# Patient Record
Sex: FEMALE | Race: WHITE | Employment: OTHER | URBAN - METROPOLITAN AREA
[De-identification: names, ages, dates, MRNs, and addresses within clinical notes are randomized per-mention and may not be internally consistent; named-entity substitution may affect disease eponyms.]

---

## 2017-01-06 PROCEDURE — 81003 URINALYSIS AUTO W/O SCOPE: CPT | Performed by: FAMILY MEDICINE

## 2017-08-30 PROCEDURE — 87186 SC STD MICRODIL/AGAR DIL: CPT | Performed by: FAMILY MEDICINE

## 2017-08-30 PROCEDURE — 87088 URINE BACTERIA CULTURE: CPT | Performed by: FAMILY MEDICINE

## 2017-08-30 PROCEDURE — 87086 URINE CULTURE/COLONY COUNT: CPT | Performed by: FAMILY MEDICINE

## 2018-02-01 PROCEDURE — 88175 CYTOPATH C/V AUTO FLUID REDO: CPT | Performed by: OBSTETRICS & GYNECOLOGY

## 2019-02-03 PROBLEM — M81.0 AGE-RELATED OSTEOPOROSIS WITHOUT CURRENT PATHOLOGICAL FRACTURE: Status: ACTIVE | Noted: 2019-02-03

## 2019-05-29 ENCOUNTER — HOSPITAL ENCOUNTER (OUTPATIENT)
Dept: GENERAL RADIOLOGY | Facility: HOSPITAL | Age: 74
Discharge: HOME | End: 2019-05-29
Attending: FAMILY MEDICINE
Payer: OTHER MISCELLANEOUS

## 2019-05-29 ENCOUNTER — OFFICE VISIT (OUTPATIENT)
Dept: OTHER | Facility: HOSPITAL | Age: 74
End: 2019-05-29
Attending: FAMILY MEDICINE
Payer: OTHER MISCELLANEOUS

## 2019-05-29 DIAGNOSIS — S20.212A RIB CONTUSION, LEFT, INITIAL ENCOUNTER: Primary | ICD-10-CM

## 2019-05-29 DIAGNOSIS — S70.02XA CONTUSION OF LEFT HIP, INITIAL ENCOUNTER: ICD-10-CM

## 2019-05-29 PROCEDURE — 71101 X-RAY EXAM UNILAT RIBS/CHEST: CPT | Performed by: FAMILY MEDICINE

## 2019-05-29 PROCEDURE — 73502 X-RAY EXAM HIP UNI 2-3 VIEWS: CPT | Performed by: FAMILY MEDICINE

## 2019-05-31 ENCOUNTER — APPOINTMENT (OUTPATIENT)
Dept: OTHER | Facility: HOSPITAL | Age: 74
End: 2019-05-31
Attending: FAMILY MEDICINE
Payer: OTHER MISCELLANEOUS

## 2019-06-04 ENCOUNTER — OFFICE VISIT (OUTPATIENT)
Dept: OTHER | Facility: HOSPITAL | Age: 74
End: 2019-06-04
Attending: PREVENTIVE MEDICINE
Payer: OTHER MISCELLANEOUS

## 2019-06-04 ENCOUNTER — HOSPITAL ENCOUNTER (OUTPATIENT)
Dept: GENERAL RADIOLOGY | Facility: HOSPITAL | Age: 74
Discharge: HOME | End: 2019-06-04
Attending: PREVENTIVE MEDICINE
Payer: OTHER MISCELLANEOUS

## 2019-06-04 ENCOUNTER — HOSPITAL ENCOUNTER (OUTPATIENT)
Dept: CT IMAGING | Facility: HOSPITAL | Age: 74
Discharge: HOME | End: 2019-06-04
Attending: PREVENTIVE MEDICINE
Payer: OTHER MISCELLANEOUS

## 2019-06-04 DIAGNOSIS — S20.212S RIB CONTUSION, LEFT, SEQUELA: Primary | ICD-10-CM

## 2019-06-04 PROCEDURE — 71100 X-RAY EXAM RIBS UNI 2 VIEWS: CPT | Performed by: PREVENTIVE MEDICINE

## 2019-06-04 PROCEDURE — 70450 CT HEAD/BRAIN W/O DYE: CPT | Performed by: PREVENTIVE MEDICINE

## 2019-06-13 ENCOUNTER — OFFICE VISIT (OUTPATIENT)
Dept: OTHER | Facility: HOSPITAL | Age: 74
End: 2019-06-13
Attending: PREVENTIVE MEDICINE
Payer: OTHER MISCELLANEOUS

## 2019-06-13 ENCOUNTER — HOSPITAL ENCOUNTER (OUTPATIENT)
Dept: ULTRASOUND IMAGING | Facility: HOSPITAL | Age: 74
Discharge: HOME | End: 2019-06-13
Attending: PREVENTIVE MEDICINE
Payer: OTHER MISCELLANEOUS

## 2019-06-13 DIAGNOSIS — S80.11XA CONTUSION OF RIGHT LOWER LEG: Primary | ICD-10-CM

## 2019-06-13 PROCEDURE — 93971 EXTREMITY STUDY: CPT | Performed by: PREVENTIVE MEDICINE

## 2019-06-21 ENCOUNTER — OFFICE VISIT (OUTPATIENT)
Dept: PHYSICAL THERAPY | Age: 74
End: 2019-06-21
Attending: PREVENTIVE MEDICINE
Payer: OTHER MISCELLANEOUS

## 2019-06-21 DIAGNOSIS — S16.1XXA CERVICAL STRAIN: ICD-10-CM

## 2019-06-21 DIAGNOSIS — S20.219A CONTUSION OF CHEST: ICD-10-CM

## 2019-06-21 DIAGNOSIS — S46.919A SHOULDER STRAIN: ICD-10-CM

## 2019-06-21 PROCEDURE — 97162 PT EVAL MOD COMPLEX 30 MIN: CPT

## 2019-06-21 PROCEDURE — 97110 THERAPEUTIC EXERCISES: CPT

## 2019-06-24 ENCOUNTER — OFFICE VISIT (OUTPATIENT)
Dept: PHYSICAL THERAPY | Age: 74
End: 2019-06-24
Attending: PREVENTIVE MEDICINE
Payer: OTHER MISCELLANEOUS

## 2019-06-24 PROCEDURE — 97140 MANUAL THERAPY 1/> REGIONS: CPT

## 2019-06-24 PROCEDURE — 97110 THERAPEUTIC EXERCISES: CPT

## 2019-06-24 NOTE — PROGRESS NOTES
Dx: Shoulder strain; Cervical strain; Contusion of chest         Insurance (Authorized # of Visits):  [id number] [facility] Physician: Dr. Carmela White  Next MD visit: none scheduled  Fall Risk: standard         Precautions: n/a             Subjective: No new Standing: bilat shoulder flexion, abduction x10 ea       Reviewed HEP--cues provided for proper performance of all exercises       HEP: Added scapular rows and sh ext with RTB to HEP    Charges: Ex 2 MT 1       Total Timed Treatment: 45 min  Total Treatm

## 2019-06-27 ENCOUNTER — APPOINTMENT (OUTPATIENT)
Dept: PHYSICAL THERAPY | Age: 74
End: 2019-06-27
Attending: PREVENTIVE MEDICINE
Payer: OTHER MISCELLANEOUS

## 2019-06-28 ENCOUNTER — OFFICE VISIT (OUTPATIENT)
Dept: PHYSICAL THERAPY | Age: 74
End: 2019-06-28
Attending: FAMILY MEDICINE
Payer: OTHER MISCELLANEOUS

## 2019-06-28 PROCEDURE — 97140 MANUAL THERAPY 1/> REGIONS: CPT

## 2019-06-28 PROCEDURE — 97110 THERAPEUTIC EXERCISES: CPT

## 2019-06-28 NOTE — PROGRESS NOTES
Dx: Shoulder strain; Cervical strain; Contusion of chest         Insurance (Authorized # of Visits):  6           Authorizing Physician: Dr. Ribeiro ref.  provider found  Next MD visit: none scheduled  Fall Risk: standard         Precautions: n/a             Sub cervical paraspinal STM x15 min  Supine: manual cervical spine PROM, UT stretches and levator scap stretches, suboccipital release and cervical paraspinal STM x15 min       Supine: AAROM L/R shoulder all planes x 10 ea  Supine: AROM bilat shoulder OH AROM

## 2019-07-01 ENCOUNTER — OFFICE VISIT (OUTPATIENT)
Dept: PHYSICAL THERAPY | Age: 74
End: 2019-07-01
Attending: PREVENTIVE MEDICINE
Payer: OTHER MISCELLANEOUS

## 2019-07-01 PROCEDURE — 97110 THERAPEUTIC EXERCISES: CPT

## 2019-07-01 PROCEDURE — 97140 MANUAL THERAPY 1/> REGIONS: CPT

## 2019-07-01 NOTE — PROGRESS NOTES
Dx: Shoulder strain; Cervical strain; Contusion of chest         Insurance (Authorized # of Visits):  10           Authorizing Physician: Dr. Shwetha Clayton  Next MD visit: none scheduled  Fall Risk: standard         Precautions: n/a             Subjective: Patient UBE x6 min F/B     Doorway pectoral stretch W and ^    3x10 sec Doorway pectoral stretch W and ^    3x10 sec Doorway pectoral stretch W and ^ 3x10 sec     Wall press ups x10 Wall push-ups x12 reps Wall push ups 2x8     Ball walks with yellow theraball x10

## 2019-07-08 ENCOUNTER — OFFICE VISIT (OUTPATIENT)
Dept: PHYSICAL THERAPY | Age: 74
End: 2019-07-08
Attending: PREVENTIVE MEDICINE
Payer: OTHER MISCELLANEOUS

## 2019-07-08 PROCEDURE — 97110 THERAPEUTIC EXERCISES: CPT

## 2019-07-08 PROCEDURE — 97140 MANUAL THERAPY 1/> REGIONS: CPT

## 2019-07-08 NOTE — PROGRESS NOTES
Dx: Shoulder strain; Cervical strain; Contusion of chest         Insurance (Authorized # of Visits):  10           Authorizing Physician: Dr. Andres Caal MD visit: none scheduled  Fall Risk: standard         Precautions: n/a             Subjective: Patient continue with ACJ mobilizations, continue with over head endurance activities   Date: 6/24/2019  TX#: 2/6 Date:6/28/2019             TX#: 3/6 Date:7/1/2019                TX#: 4/6 Date:7/8/2019                TX#: 5/6 Date:    Tx#: 6/   UBE x6 min F/B UBE x ball x10 reps  2 lbs ball with shooting form x10 reps    Seated: IASTM R/L upper trap x 4 min total Seated: IASTM R/L upper trap x 4 min total -- --    Standing: bilat shoulder flexion, abduction x10 ea -- -- --    Reviewed HEP--cues provided for proper pe

## 2019-07-11 ENCOUNTER — APPOINTMENT (OUTPATIENT)
Dept: OTHER | Facility: HOSPITAL | Age: 74
End: 2019-07-11
Attending: PREVENTIVE MEDICINE
Payer: OTHER MISCELLANEOUS

## 2019-07-11 ENCOUNTER — APPOINTMENT (OUTPATIENT)
Dept: PHYSICAL THERAPY | Age: 74
End: 2019-07-11
Attending: FAMILY MEDICINE
Payer: OTHER MISCELLANEOUS

## 2019-07-12 ENCOUNTER — OFFICE VISIT (OUTPATIENT)
Dept: PHYSICAL THERAPY | Age: 74
End: 2019-07-12
Attending: FAMILY MEDICINE
Payer: OTHER MISCELLANEOUS

## 2019-07-12 PROCEDURE — 97110 THERAPEUTIC EXERCISES: CPT

## 2019-07-12 PROCEDURE — 97140 MANUAL THERAPY 1/> REGIONS: CPT

## 2019-07-12 NOTE — PROGRESS NOTES
ProgressSummary  Pt has attended 6 visits in Physical Therapy. Jerson Contreras reports that she feels 50% improvement in shoulder and neck function and pain since starting physical therapy.  She has made good progress with cervical and shoulder ROM in all direct be able to increase shoulder flexion ROM bilaterally to 170 degrees to demonstrate improved range to do water aerobics. In Progress      Plan: Continue skilled Physical Therapy plan of care for 2 x/week for a total of 8 more visits over a 90 day period. of mirror into flx and abd x10 reps with 2 sec hold at top, R/L   bilat scapular rows and ext RTB x10 ea  bilat scapular rows and ext RTB x12 ea Bilateral scapular rows and ext GTB x10 each  -- --   Supine: manual cervical spine PROM, UT stretches and leva

## 2019-07-18 ENCOUNTER — APPOINTMENT (OUTPATIENT)
Dept: PHYSICAL THERAPY | Age: 74
End: 2019-07-18
Attending: FAMILY MEDICINE
Payer: OTHER MISCELLANEOUS

## 2019-07-26 ENCOUNTER — OFFICE VISIT (OUTPATIENT)
Dept: PHYSICAL THERAPY | Age: 74
End: 2019-07-26
Attending: FAMILY MEDICINE
Payer: OTHER MISCELLANEOUS

## 2019-07-26 PROCEDURE — 97110 THERAPEUTIC EXERCISES: CPT

## 2019-07-26 PROCEDURE — 97140 MANUAL THERAPY 1/> REGIONS: CPT

## 2019-07-26 NOTE — PROGRESS NOTES
Dx: Shoulder strain; Cervical strain; Contusion of chest         Insurance (Authorized # of Visits):  15           Authorizing Physician: Dr. Andres Caal MD visit: 8/11/19  Fall Risk: standard         Precautions: n/a              Subjective: No n Date: 6/24/2019  TX#: 2/6 Date:6/28/2019             TX#: 3/6 Date:7/1/2019                TX#: 4/6 Date:7/8/2019                TX#: 5/6 Date: 7/12/2019  Tx#: 6/6 Date: 7/26/2019  Tx# 7/12   UBE x6 min F/B UBE x6 min F/B UBE x6 min F/B UBE x6 min F/B paraspinal STM x 5 minutes    ACJ mobilizations inferior and AP 5x10 sec oscillations --          -- Supine: manual UT stretches, suboccipital release and cervical paraspinal STM x 15 minutes   Supine: AAROM L/R shoulder all planes x 10 ea  Supine: AROM bi

## 2019-07-29 ENCOUNTER — APPOINTMENT (OUTPATIENT)
Dept: PHYSICAL THERAPY | Age: 74
End: 2019-07-29
Attending: FAMILY MEDICINE
Payer: OTHER MISCELLANEOUS

## 2019-07-29 PROCEDURE — 97110 THERAPEUTIC EXERCISES: CPT

## 2019-07-29 PROCEDURE — 97140 MANUAL THERAPY 1/> REGIONS: CPT

## 2019-07-29 NOTE — PROGRESS NOTES
Dx: Shoulder strain; Cervical strain; Contusion of chest         Insurance (Authorized # of Visits):  15           Authorizing Physician: Dr. Peggy Cates          Next MD visit: 8/11/19  Fall Risk: standard         Precautions: n/a              Subjective: No n x10 Wall push-ups x12 reps Wall push ups 2x8     Ball walks with yellow theraball x10    Ball wall toss 2x12 each  Wall push ups 2x8     Ball walks with yellow theraball x10    Ball wall toss 2x30 sec each  Wall push ups 2x10     Ball walks with yellow the shoulder all planes x 10 ea  Supine: bilat shoulder flexion and abduction AROM x10 each Supine: AAROM L/R shoulder flexion and abd x 10 ea  Supine: bilat shoulder flexion AROM x10 each    Standing overhead ball throws with . 5lbs ball x10 reps  1 lbs ball x

## 2019-08-05 ENCOUNTER — OFFICE VISIT (OUTPATIENT)
Dept: PHYSICAL THERAPY | Age: 74
End: 2019-08-05
Attending: FAMILY MEDICINE
Payer: OTHER MISCELLANEOUS

## 2019-08-05 PROCEDURE — 97014 ELECTRIC STIMULATION THERAPY: CPT

## 2019-08-05 PROCEDURE — 97140 MANUAL THERAPY 1/> REGIONS: CPT

## 2019-08-05 PROCEDURE — 97110 THERAPEUTIC EXERCISES: CPT

## 2019-08-05 NOTE — PROGRESS NOTES
Dx: Shoulder strain; Cervical strain; Contusion of chest         Insurance (Authorized # of Visits):  15           Authorizing Physician: Dr. Andres Caal MD visit: 8/11/19  Fall Risk: standard         Precautions: n/a              Subjective: No n stretch W and ^ 3x10 sec Doorway pectoral stretch W and ^ 3x10 sec  Doorway pectoral stretch W and ^ 3x10 sec  Doorway pectoral stretch W and ^ 3x10 sec  Doorway pectoral stretch W and ^ 3x10 sec   Wall press ups x10 Wall push-ups x12 reps Wall push ups 2x inferior and AP 5x10 sec oscillations --          -- Supine: manual UT stretches, suboccipital release and cervical paraspinal STM x 15 minutes  Supine: manual UT stretches, suboccipital release and cervical paraspinal STM x 15 minutes Supine: manual UT st

## 2019-08-08 ENCOUNTER — APPOINTMENT (OUTPATIENT)
Dept: OTHER | Facility: HOSPITAL | Age: 74
End: 2019-08-08
Attending: PREVENTIVE MEDICINE
Payer: OTHER MISCELLANEOUS

## 2019-08-16 ENCOUNTER — OFFICE VISIT (OUTPATIENT)
Dept: PHYSICAL THERAPY | Age: 74
End: 2019-08-16
Attending: FAMILY MEDICINE
Payer: OTHER MISCELLANEOUS

## 2019-08-16 PROCEDURE — 97140 MANUAL THERAPY 1/> REGIONS: CPT

## 2019-08-16 PROCEDURE — 97014 ELECTRIC STIMULATION THERAPY: CPT

## 2019-08-16 PROCEDURE — 97110 THERAPEUTIC EXERCISES: CPT

## 2019-08-16 NOTE — PROGRESS NOTES
Dx: Shoulder strain; Cervical strain; Contusion of chest         Insurance (Authorized # of Visits):  15           Authorizing Physician: Dr. Shahid Henderson          Next MD visit: 8/11/19  Fall Risk: standard         Precautions: n/a              Subjective: No n x10    Ball wall toss 2x12 each  Wall push ups 2x8     Ball walks with yellow theraball x10    Ball wall toss 2x30 sec each  Wall push ups 2x10     Ball walks with yellow theraball x10    Ball wall toss 2x30 sec each   Wall push ups 2x10     Standing bilat suboccipital release and cervical paraspinal STM x 15 minutes Supine: manual UT stretches, suboccipital release and cervical paraspinal STM x 15 minutes Supine: manual UT stretches, suboccipital release and cervical paraspinal STM x 15 minutes   Supine: AA

## 2019-08-22 ENCOUNTER — APPOINTMENT (OUTPATIENT)
Dept: PHYSICAL THERAPY | Age: 74
End: 2019-08-22
Attending: FAMILY MEDICINE
Payer: OTHER MISCELLANEOUS

## 2019-08-26 ENCOUNTER — OFFICE VISIT (OUTPATIENT)
Dept: PHYSICAL THERAPY | Age: 74
End: 2019-08-26
Attending: FAMILY MEDICINE
Payer: OTHER MISCELLANEOUS

## 2019-08-26 PROCEDURE — 97014 ELECTRIC STIMULATION THERAPY: CPT

## 2019-08-26 PROCEDURE — 97140 MANUAL THERAPY 1/> REGIONS: CPT

## 2019-08-26 PROCEDURE — 97110 THERAPEUTIC EXERCISES: CPT

## 2019-08-26 NOTE — PROGRESS NOTES
Dx: Shoulder strain; Cervical strain; Contusion of chest         Insurance (Authorized # of Visits):  15           Authorizing Physician: Dr. Michelle Mcduffie          Next MD visit: 8/11/19  Fall Risk: standard         Precautions: n/a              Subjective: No n x10    Ball wall toss 2x30 sec each  Wall push ups 2x10     Ball walks with yellow theraball x10    Ball wall toss 2x30 sec each   Wall push ups 2x10     Standing bilat shoulder AROM flex, abd full range x10 ea  Wall push ups with plus 2x10     Standing bi minutes  Supine: manual UT stretches, suboccipital release and cervical paraspinal STM x 15 minutes Supine: manual UT stretches, suboccipital release and cervical paraspinal STM x 15 minutes Supine: manual UT stretches, suboccipital release and cervical pa

## 2019-09-05 ENCOUNTER — APPOINTMENT (OUTPATIENT)
Dept: OTHER | Facility: HOSPITAL | Age: 74
End: 2019-09-05
Attending: PREVENTIVE MEDICINE
Payer: OTHER MISCELLANEOUS

## 2019-09-05 ENCOUNTER — OFFICE VISIT (OUTPATIENT)
Dept: PHYSICAL THERAPY | Age: 74
End: 2019-09-05
Attending: FAMILY MEDICINE
Payer: OTHER MISCELLANEOUS

## 2019-09-05 PROCEDURE — 97110 THERAPEUTIC EXERCISES: CPT

## 2019-09-05 PROCEDURE — 97140 MANUAL THERAPY 1/> REGIONS: CPT

## 2019-09-05 NOTE — PROGRESS NOTES
Dx: Shoulder strain; Cervical strain; Contusion of chest         Insurance (Authorized # of Visits):  15           Authorizing Physician: Dr. Bel Ziegler          Next MD visit: 8/11/19  Fall Risk: standard         Precautions: n/a              Progress/Dischar pain free    FOTO: shoulder 83/100, neck 87/100    Plan: Hold chart open x 30 days in case symptoms worsen with return to regular exercise at gym. Will be discharged from PT if no further contact with our office in 30 days.     Patient/Family/Caregiver was push-ups x12 reps Wall push ups 2x8     Ball walks with yellow theraball x10    Ball wall toss 2x12 each  Wall push ups 2x8     Ball walks with yellow theraball x10    Ball wall toss 2x30 sec each  Wall push ups 2x10     Ball walks with yellow theraball x1 cervical paraspinal STM x 9 minutes Supine: manual UT stretches, suboccipital release and cervical paraspinal STM x 5 minutes    ACJ mobilizations inferior and AP 5x10 sec oscillations --          -- Supine: manual UT stretches, suboccipital release and ce Charges: Ex 2 MT 1        Total Timed Treatment: 35 min  Total Treatment Time: 50 min

## 2019-09-09 ENCOUNTER — APPOINTMENT (OUTPATIENT)
Dept: PHYSICAL THERAPY | Age: 74
End: 2019-09-09
Attending: FAMILY MEDICINE

## 2019-09-12 ENCOUNTER — APPOINTMENT (OUTPATIENT)
Dept: PHYSICAL THERAPY | Age: 74
End: 2019-09-12
Attending: FAMILY MEDICINE

## 2019-09-16 ENCOUNTER — APPOINTMENT (OUTPATIENT)
Dept: PHYSICAL THERAPY | Age: 74
End: 2019-09-16
Attending: FAMILY MEDICINE

## 2019-09-19 ENCOUNTER — APPOINTMENT (OUTPATIENT)
Dept: PHYSICAL THERAPY | Age: 74
End: 2019-09-19
Attending: FAMILY MEDICINE

## 2021-10-15 PROBLEM — M48.02 NEURAL FORAMINAL STENOSIS OF CERVICAL SPINE: Status: ACTIVE | Noted: 2021-10-15

## 2021-10-15 PROBLEM — M47.812 ARTHRITIS OF FACET JOINT OF CERVICAL SPINE: Status: ACTIVE | Noted: 2021-10-15

## 2021-10-15 PROBLEM — M47.812 CERVICAL SPONDYLOSIS: Status: ACTIVE | Noted: 2021-10-15

## 2025-07-14 NOTE — PROGRESS NOTES
Chief Complaint   Patient presents with    Follow-up    ADHD     Have you been to the ER, urgent care clinic since your last visit?  Hospitalized since your last visit?   NO    Have you seen or consulted any other health care providers outside our system since your last visit?   NO     “Have you had a pap smear?”    NO    No cervical cancer screening on file       “Have you had a colorectal cancer screening such as a colonoscopy/FIT/Cologuard?    NO    No colonoscopy on file  No cologuard on file  No FIT/FOBT on file   No flexible sigmoidoscopy on file             SPINE EVALUATION:   Referring Physician: Dr. Bel Ziegler  Diagnosis:  Shoulder strain; Cervical strain; Contusion of chest Date of Service: 6/21/2019     PATIENT SUMMARY   Blu Prajapati is a 72year old female who presents to therapy today with complain times per week. Pt goals include being able to do her hair in the morning without pain, work overhead for extended periods of time without pain, be able to check her blind spot when driving, and be able to go back to her exercise classes.   Past medical his bilaterally.      Strength: (* denotes performed with pain)    Strength/MMT: (* denotes performed with pain)  Shoulder Elbow   Flexion: R 4+/5; L 4-/5  Abduction: R 4/5; L 4-/5  ER: R 5/5; L 5/5  IR: R 4+/5; L 5/5 Flexion: R 5/5; L 5/5  Extension: R 5/5; L hair in the mornings. 4.Patient will be able to increase shoulder abduction ROM bilaterally to 165 degrees to demonstrate improved range to do overhead activities.    5. Patient will be able to increase shoulder flexion ROM bilaterally to 170 degrees to de

## (undated) NOTE — LETTER
Patient Name: Toshia Talavera  YOB: 1945          MRN number:  [id number]  Date:  7/12/2019  Referring Physician:  No ref. provider found      ProgressSummary  Pt has attended 6 visits in Physical Therapy.  Do Orr reports that she feels 50 4.Patient will be able to increase shoulder abduction ROM bilaterally to 165 degrees to demonstrate improved range to do overhead activities. In Progress   5.  Patient will be able to increase shoulder flexion ROM bilaterally to 170 degrees to demonstrate

## (undated) NOTE — LETTER
Patient Name: Glenys Fulton  YOB: 1945          MRN number:  [id number]  Date:  9/5/2019  Referring Physician:  No ref. provider found    Progress/Discharge Summary  Pt has attended 12 visits in Physical Therapy.  Samantha Shin reports feeling Plan: Hold chart open x 30 days in case symptoms worsen with return to regular exercise at gym. Will be discharged from PT if no further contact with our office in 30 days.     Patient/Family/Caregiver was advised of these findings, precautions, and treatme